# Patient Record
Sex: MALE | Race: ASIAN | NOT HISPANIC OR LATINO | Employment: FULL TIME | URBAN - METROPOLITAN AREA
[De-identification: names, ages, dates, MRNs, and addresses within clinical notes are randomized per-mention and may not be internally consistent; named-entity substitution may affect disease eponyms.]

---

## 2021-01-17 ENCOUNTER — HOSPITAL ENCOUNTER (EMERGENCY)
Facility: HOSPITAL | Age: 31
Discharge: HOME | End: 2021-01-17
Attending: EMERGENCY MEDICINE | Admitting: EMERGENCY MEDICINE

## 2021-01-17 VITALS
SYSTOLIC BLOOD PRESSURE: 99 MMHG | OXYGEN SATURATION: 98 % | HEART RATE: 100 BPM | TEMPERATURE: 96.3 F | WEIGHT: 101.9 LBS | DIASTOLIC BLOOD PRESSURE: 51 MMHG | RESPIRATION RATE: 18 BRPM

## 2021-01-17 DIAGNOSIS — Z00.8 ENCOUNTER FOR PSYCHOLOGICAL EVALUATION: ICD-10-CM

## 2021-01-17 DIAGNOSIS — F32.A DEPRESSION: Primary | ICD-10-CM

## 2021-01-17 DIAGNOSIS — F10.10 ALCOHOL ABUSE: ICD-10-CM

## 2021-01-17 LAB
AMPHETAMINES SERPL QL SCN: NEGATIVE
BARBITURATES UR QL: NEGATIVE
BENZODIAZ UR QL: NEGATIVE
BILIRUB UR QL STRIP: NEGATIVE
CLARITY UR: CLEAR
COCAINE UR QL: NEGATIVE
COLOR UR: NORMAL
ETHANOL EXG-MCNC: 0.08 MG/DL
FLUAV RNA RESP QL NAA+PROBE: NEGATIVE
FLUBV RNA RESP QL NAA+PROBE: NEGATIVE
GLUCOSE UR STRIP-MCNC: NEGATIVE MG/DL
HGB UR QL STRIP.AUTO: NEGATIVE
KETONES UR STRIP-MCNC: NEGATIVE MG/DL
LEUKOCYTE ESTERASE UR QL STRIP: NEGATIVE
METHADONE UR QL: NEGATIVE
NITRITE UR QL STRIP: NEGATIVE
OPIATES UR QL SCN: NEGATIVE
OXYCODONE+OXYMORPHONE UR QL SCN: NEGATIVE
PCP UR QL: NEGATIVE
PH UR STRIP.AUTO: 6.5 [PH]
PROT UR STRIP-MCNC: NEGATIVE MG/DL
RSV RNA RESP QL NAA+PROBE: NEGATIVE
SARS-COV-2 RNA RESP QL NAA+PROBE: NEGATIVE
SP GR UR STRIP.AUTO: 1.01 (ref 1–1.04)
THC UR QL: NEGATIVE
UROBILINOGEN UA: NEGATIVE MG/DL

## 2021-01-17 PROCEDURE — 99284 EMERGENCY DEPT VISIT MOD MDM: CPT

## 2021-01-17 PROCEDURE — 80307 DRUG TEST PRSMV CHEM ANLYZR: CPT | Performed by: EMERGENCY MEDICINE

## 2021-01-17 PROCEDURE — 82075 ASSAY OF BREATH ETHANOL: CPT | Performed by: EMERGENCY MEDICINE

## 2021-01-17 PROCEDURE — 0241U HB NFCT DS VIR RESP RNA 4 TRGT: CPT | Performed by: EMERGENCY MEDICINE

## 2021-01-17 PROCEDURE — 99284 EMERGENCY DEPT VISIT MOD MDM: CPT | Performed by: EMERGENCY MEDICINE

## 2021-01-17 NOTE — ED NOTES
Step-Father, Fay Beaulieu would like to be called with updates about pt's care at the number [phone]       Cesar Rowe RN  01/17/21 6413

## 2021-01-17 NOTE — ED PROVIDER NOTES
History  Chief Complaint   Patient presents with    Psychiatric Evaluation     per [employer] pt's dad called d/t pt reporting SI  pt comfirms this during triage along with occasional AH and paranoia  pt denies HI, pt denies hx of attempts  Patient is a 51-year-old male coming in by police after his dad called reporting suicidal ideation  Patient confirms this and states that he wanted to kill himself and has current thoughts of killing himself  He has no homicidal ideations visual hallucinations  He does state that he intermittently hears people talking but there is no one around him  He does feel that people are out  To get him  It is noted that patient reported and confirmed will police the patient was asking for rope to hang himself  He does state he had similar symptoms about a year ago where he was admitted to the hospital for similar event  Patient did admit to having a few beers today  History provided by:  Patient and police   used: No    Psychiatric Evaluation  Presenting symptoms: depression, suicidal thoughts and suicidal threats    Degree of incapacity (severity): Unable to specify  Onset quality:  Unable to specify  Timing:  Unable to specify  Progression:  Unable to specify  Chronicity:  Recurrent  Context: alcohol use, noncompliance and stressful life event    Context: not drug abuse, not medication and not recent medication change    Treatment compliance:  None of the time  Relieved by:  None tried  Worsened by:  Nothing  Ineffective treatments:  None tried  Associated symptoms: distractible and poor judgment    Associated symptoms: no abdominal pain, no chest pain, no feelings of worthlessness, no headaches and no hypersomnia    Risk factors: hx of mental illness    Risk factors: no family hx of mental illness and no family violence        None       History reviewed  No pertinent past medical history  History reviewed  No pertinent surgical history      History reviewed  No pertinent family history  I have reviewed and agree with the history as documented  E-Cigarette/Vaping     E-Cigarette/Vaping Substances     Social History     Tobacco Use    Smoking status: Current Every Day Smoker     Packs/day: 0 50     Types: Cigarettes    Smokeless tobacco: Never Used   Substance Use Topics    Alcohol use: Yes     Comment: beer daily about 2 cans    Drug use: Never       Review of Systems   Constitutional: Negative  Negative for chills and fever  HENT: Negative  Negative for ear pain and sore throat  Eyes: Negative  Negative for pain and visual disturbance  Respiratory: Negative  Negative for cough and shortness of breath  Cardiovascular: Negative  Negative for chest pain and palpitations  Gastrointestinal: Negative  Negative for abdominal pain and vomiting  Genitourinary: Negative  Negative for dysuria and hematuria  Musculoskeletal: Negative  Negative for arthralgias and back pain  Skin: Negative  Negative for color change and rash  Neurological: Negative for seizures, syncope and headaches  Hematological: Negative  Psychiatric/Behavioral: Positive for suicidal ideas  All other systems reviewed and are negative  Physical Exam  Physical Exam  Vitals signs and nursing note reviewed  Constitutional:       Appearance: He is well-developed  HENT:      Head: Normocephalic and atraumatic  Eyes:      Conjunctiva/sclera: Conjunctivae normal    Neck:      Musculoskeletal: Neck supple  Cardiovascular:      Rate and Rhythm: Normal rate and regular rhythm  Heart sounds: No murmur  Pulmonary:      Effort: Pulmonary effort is normal  No respiratory distress  Breath sounds: Normal breath sounds  Abdominal:      Palpations: Abdomen is soft  Tenderness: There is no abdominal tenderness  Skin:     General: Skin is warm and dry  Neurological:      Mental Status: He is alert  GCS: GCS eye subscore is 4   GCS verbal subscore is 5  GCS motor subscore is 6  Cranial Nerves: Cranial nerves are intact  Sensory: Sensation is intact  Motor: Motor function is intact  Coordination: Coordination is intact  Gait: Gait is intact  Psychiatric:         Attention and Perception: Attention and perception normal          Mood and Affect: Mood and affect normal          Speech: Speech normal          Behavior: Behavior normal          Vital Signs  ED Triage Vitals [01/17/21 1622]   Temperature Pulse Respirations Blood Pressure SpO2   (!) 96 3 °F (35 7 °C) 99 18 157/91 100 %      Temp Source Heart Rate Source Patient Position - Orthostatic VS BP Location FiO2 (%)   Tympanic Monitor Lying Left arm --      Pain Score       --           Vitals:    01/17/21 1622 01/17/21 1954   BP: 157/91 99/51   Pulse: 99 100   Patient Position - Orthostatic VS: Lying Sitting         Visual Acuity      ED Medications  Medications - No data to display    Diagnostic Studies  Results Reviewed     Procedure Component Value Units Date/Time    COVID19, Influenza A/B, RSV PCR, [facility] [[id number]]  (Normal) Collected: 01/17/21 1738    Lab Status: Final result Specimen: Nasopharyngeal from Nose Updated: 01/17/21 1823     SARS-CoV-2 Negative     INFLUENZA A PCR Negative     INFLUENZA B PCR Negative     RSV PCR Negative    Narrative: This test has been authorized by FDA under an EUA (Emergency Use Assay) for use by authorized laboratories  Clinical caution and judgement should be used with the interpretation of these results with consideration of the clinical impression and other laboratory testing  Testing reported as "Positive" or "Negative" has been proven to be accurate according to standard laboratory validation requirements  All testing is performed with control materials showing appropriate reactivity at standard intervals      Rapid drug screen, urine [[id number]]  (Normal) Collected: 01/17/21 1653    Lab Status: Final result Specimen: Urine, Clean Catch Updated: 01/17/21 1719     Amph/Meth UR Negative     Barbiturate Ur Negative     Benzodiazepine Urine Negative     Cocaine Urine Negative     Methadone Urine Negative     Opiate Urine Negative     PCP Ur Negative     THC Urine Negative     Oxycodone Urine Negative    Narrative:      FOR MEDICAL PURPOSES ONLY  IF CONFIRMATION NEEDED PLEASE CONTACT THE LAB WITHIN 5 DAYS  Drug Screen Cutoff Levels:  AMPHETAMINE/METHAMPHETAMINES  1000 ng/mL  BARBITURATES     200 ng/mL  BENZODIAZEPINES     200 ng/mL  COCAINE      300 ng/mL  METHADONE      300 ng/mL  OPIATES      300 ng/mL  PHENCYCLIDINE     25 ng/mL  THC       50 ng/mL  OXYCODONE      100 ng/mL    UA (URINE) with reflex to Scope [[id number]]  (Normal) Collected: 01/17/21 1653    Lab Status: Final result Specimen: Urine, Clean Catch Updated: 01/17/21 1701     Color, UA Straw     Clarity, UA Clear     Specific Gravity, UA 1 015     pH, UA 6 5     Leukocytes, UA Negative     Nitrite, UA Negative     Protein, UA Negative mg/dl      Glucose, UA Negative mg/dl      Ketones, UA Negative mg/dl      Bilirubin, UA Negative     Blood, UA Negative     UROBILINOGEN UA Negative mg/dL     POCT alcohol breath test [[id number]]  (Normal) Resulted: 01/17/21 1650    Lab Status: Final result Updated: 01/17/21 1651     EXTBreath Alcohol 0 084                 No orders to display              Procedures  Procedures         ED Course  ED Course as of Jan 21 1012   Sun Jan 17, 2021   1623   Patient is a 49-year-old male brought in by police for crisis evaluation  Patient a suicidal with plan to hang himself  He is cooperative and calm  Will start behavior health workup    Portions of the record may have been created with voice recognition software  Occasional wrong word or "sound a like" substitutions may have occurred due to the inherent limitations of voice recognition software   Read the chart carefully and recognize, using context, where substitutions have occurred  1643  Patient resting in bed  Per staff patient was asking to leave  I did discuss with him that given he has made statements of suicidal thoughts and ideation that he has sustained emergency room for further evaluation  Eric  Parents were met with myself, Shanelle Campbell RN as well as Xavi hanks from crisis  Long discussion with family  Patient was addicted to methamphetamines approximately 1 and half to 2 years ago  Since that time they noticed a temper and mood changes since then  Today patient was having an argument with his mother when 5 patient's father states that he was saying that he was going to kill himself and asking for open looks something to hurt himself with  1714 And alcohol is 0 084  He will be sober within an hour  Crisis aware of patient      1720 Patient drug screen negative and UA clear  Pending COVID and crisis eval      1827 COVID negative  Patient is medically cleared for crisis evaluation as well as inpatient psychiatric evaluation      [facility] has met with patient  Pending discussion with crisis for further follow-up      1904 Long discussion with crisis as well as staff  Patient states that he is very frustrated and upset with his mother  He has lot of social issues that are causing him stress  He does drink approximately 2-3 cans of beer per day  He states that what he said today was a statement of anger and frustration  He is suicidal   Will discuss with parents regarding plan for potential discharge as will have crisis and discussed with him if appropriate for outpatient therapy      1917 WILLIE Argueta discussed with family on the phone at length  They will call back as they are concerned regarding his behavior      1937 Long discussion With patient  Patient states that he is very frustrated with his mother and he does want to hurt himself  He states that he was very angry and said some things  He understands what he said    He is not suicidal homicidal is medically stable at this time  He is not intoxicated  Family was called and notified and their decision as they do not feel strongly that patient should have a 36 petition for such  I do feel it is patient's best interest to follow up with an outpatient I discussed this with him  Also discussed family  He will be going home with family will plan for [place]      2018 Patient's father came in  Patient was discharged home                                SBIRT 22yo+      Most Recent Value   SBIRT (24 yo +)   In order to provide better care to our patients, we are screening all of our patients for alcohol and drug use  Would it be okay to ask you these screening questions? No Filed at: 01/17/2021 1834                    MDM    Disposition  Final diagnoses:   Depression   Encounter for psychological evaluation   Alcohol abuse     Time reflects when diagnosis was documented in both MDM as applicable and the Disposition within this note     Time User Action Codes Description Comment    1/17/2021  5:21 PM [place] Sale Add [F32 9] Depression     1/17/2021  5:21 PM [place] Sale Add [Z00 8] Encounter for psychological evaluation     1/17/2021  7:39 PM [place] Sale Add [F10 10] Alcohol abuse       ED Disposition     ED Disposition Condition Date/Time Comment    Discharge Stable Sun Jan 17, 2021  7:38 PM Marga Garcia discharge to home/self care              Follow-up Information     Follow up With Specialties Details Why Contact Info Additional [address] [facility] Family Medicine   [facility] [address]  [address] [place] [address] Dr Lee [id number]       [address] [place] [address] Family Medicine Schedule an appointment as soon as possible for a visit in 1 week  [address], [address] [facility] [id number]  [address], [address], [address] [place], [place], [address]          There are no discharge medications for this patient          PDMP Review     None          ED Provider  Electronically Signed by           Vera Ruby,   01/17/21 5909 Alla Davis,   01/21/21 1014

## 2021-01-17 NOTE — ED NOTES
Patient arrived by police, called by his step-father, after patient had a verbal conflict with his mother and threatened to kill himself  Step-father stated he was walking around reportedly looking for a rope  He admitted all of this to the police, to the attending physician, to his nurse and to ED crisis, attributing his behavior to getting angry with his mother  Patient stated that his girlfriend and their 25month-old child had been living with him, his mother and his step-father  The girlfriend reportedly was saying that she wanted to end the relationship  Patient suspected she had been interested in someone else  About 2 weeks ago, he told her, "Go ahead  Leave," and she took their son and left  He stated his relationship with his mother is a stressor because she continuously asks him what he is doing to make money and what is he doing to prepare to leave the home and go out on his own  He describes this as a continuous barrage of questions that makes him extremely angry  He stated, "I'm working all the time  I just work, eat and sleep  I can't do any more at this time  I contribute to the household and I try to support my son too " Patient stated financial issues contribute as well, as he would like to move out  He stated his mother tells him to get out but he is one of the owners of the home  Patient stated he drinks beer most days, one or 2 cans prior to dinner  He stated he does not believe this is an issue  He stated he has a history of heroin and meth use and has not used in more than a year  He admits that he does not feel 100% at this time, as he is recovering from a break up  However, he refuses inpatient behavioral health

## 2021-01-18 NOTE — ED NOTES
Pt's mother and step-father were called to inform them that the pt refused inpatient admission  Both were informed if they wanted the pt to be admitted for inpatient help they would have to go through county crisis to uphold and initiate a 302  Pt's declined wanting to pursue a petition for a 302 and are comfortable with picking the pt up with outpatient resources        Jenny Mejia RN  01/17/21 3631

## 2021-03-25 ENCOUNTER — TELEPHONE (OUTPATIENT)
Dept: PSYCHIATRY | Facility: CLINIC | Age: 31
End: 2021-03-25